# Patient Record
Sex: FEMALE | URBAN - NONMETROPOLITAN AREA
[De-identification: names, ages, dates, MRNs, and addresses within clinical notes are randomized per-mention and may not be internally consistent; named-entity substitution may affect disease eponyms.]

---

## 2022-06-10 ENCOUNTER — APPOINTMENT (RX ONLY)
Dept: URBAN - NONMETROPOLITAN AREA CLINIC 31 | Facility: CLINIC | Age: 47
Setting detail: DERMATOLOGY
End: 2022-06-10

## 2022-06-10 DIAGNOSIS — Z41.9 ENCOUNTER FOR PROCEDURE FOR PURPOSES OTHER THAN REMEDYING HEALTH STATE, UNSPECIFIED: ICD-10-CM

## 2022-06-10 PROCEDURE — ? DIAMONDGLOW

## 2022-06-10 PROCEDURE — ? PATIENT SPECIFIC COUNSELING

## 2022-06-10 PROCEDURE — ? DERMAPLANE

## 2022-06-10 ASSESSMENT — LOCATION DETAILED DESCRIPTION DERM
LOCATION DETAILED: LEFT INFERIOR MEDIAL FOREHEAD
LOCATION DETAILED: LEFT INFERIOR CENTRAL MALAR CHEEK

## 2022-06-10 ASSESSMENT — LOCATION SIMPLE DESCRIPTION DERM
LOCATION SIMPLE: LEFT CHEEK
LOCATION SIMPLE: LEFT FOREHEAD

## 2022-06-10 ASSESSMENT — LOCATION ZONE DERM: LOCATION ZONE: FACE

## 2022-06-10 NOTE — PROCEDURE: DIAMONDGLOW
Number Of Passes: 2
Intelliflow Setting: 100%
Vacuum Pressure In Inches: 3
Detail Level: Zone
Solution Used: SkinMedica HA5 Hydrator
Endpoint: mild erythema
Post-Care Instructions: I reviewed with the patient in detail post-care instructions. Patient should stay away from the sun and wear sun protection until treated areas are fully healed.
Consent: Written consent obtained, risks reviewed including but not limited to crusting, scabbing, blistering, scarring, darker or lighter pigmentary change, bruising, and/or incomplete response.
Body Treatment Head Used?: Not Used
Facial Treatment Head Used?: Facial: Fine (120 grit) 6 mm

## 2022-06-10 NOTE — PROCEDURE: PATIENT SPECIFIC COUNSELING
Detail Level: Zone
Does not have a skin regimen. Would like to avoid too many steps. Recommended Epionce intensive nourishing cream due to its versatile anti-aging ingredients and hydration recommended Epionce spf. \\nRecommended C&B at least three sessions in the fall. Recommended DiamondGlow every 4-8 weeks for a nice deep clean.\\n\\n\\nFriends and fam applied DH’s family member.

## 2022-06-10 NOTE — PROCEDURE: DERMAPLANE
Pre-Procedure Text: The patient was placed in a recumbant position on the procedure table.
Post-Procedure Instructions: Following the dermaplane procedure, Oxymist treatment was applied to the treatment areas. Moisturizer and SPF was applied.
Post-Care Instructions: I reviewed with the patient in detail post-care instructions.
Treatment Areas: face and neck
Detail Level: Zone
Blade: 10 blade scalpel

## 2022-06-16 ENCOUNTER — APPOINTMENT (RX ONLY)
Dept: URBAN - NONMETROPOLITAN AREA CLINIC 25 | Facility: CLINIC | Age: 47
Setting detail: DERMATOLOGY
End: 2022-06-16

## 2022-06-16 DIAGNOSIS — L98.8 OTHER SPECIFIED DISORDERS OF THE SKIN AND SUBCUTANEOUS TISSUE: ICD-10-CM

## 2022-06-16 PROCEDURE — ? BOTOX

## 2022-06-16 PROCEDURE — ? MEDICAL CONSULTATION: DYNAMIC RHYTIDES

## 2022-06-16 NOTE — PROCEDURE: BOTOX
Lot #: [id number]
Total Units: 44
Post-Care Instructions: Patient instructed to not lie down for 4 hours and limit physical activity for 24 hours.
Consent: Written consent obtained. Risks include but not limited to lid/brow ptosis, bruising, swelling, diplopia, temporary effect, incomplete chemical denervation.
Map Statement: Please see attached map for locations and injection amounts.
Detail Level: Detailed
Expiration Date (Month Year): 07/2024

## 2022-07-06 ENCOUNTER — APPOINTMENT (RX ONLY)
Dept: URBAN - NONMETROPOLITAN AREA CLINIC 31 | Facility: CLINIC | Age: 47
Setting detail: DERMATOLOGY
End: 2022-07-06

## 2022-07-06 DIAGNOSIS — L98.8 OTHER SPECIFIED DISORDERS OF THE SKIN AND SUBCUTANEOUS TISSUE: ICD-10-CM

## 2022-07-06 PROCEDURE — ? BOTOX

## 2022-07-06 NOTE — PROCEDURE: BOTOX
Consent: Written consent obtained. Risks include but not limited to lid/brow ptosis, bruising, swelling, diplopia, temporary effect, incomplete chemical denervation.
Post-Care Instructions: Patient instructed to not lie down for 4 hours and limit physical activity for 24 hours.
Total Units: 4
Map Statement: Please see attached map for locations and injection amounts.
Detail Level: Detailed

## 2023-06-07 ENCOUNTER — APPOINTMENT (RX ONLY)
Dept: URBAN - NONMETROPOLITAN AREA CLINIC 31 | Facility: CLINIC | Age: 48
Setting detail: DERMATOLOGY
End: 2023-06-07

## 2023-06-07 DIAGNOSIS — L98.8 OTHER SPECIFIED DISORDERS OF THE SKIN AND SUBCUTANEOUS TISSUE: ICD-10-CM

## 2023-06-07 PROCEDURE — ? INVENTORY

## 2023-06-07 PROCEDURE — ? BOTOX

## 2023-06-07 NOTE — PROCEDURE: BOTOX
Total Units: 0
Post-Care Instructions: Patient instructed to not lie down for 4 hours and limit physical activity for 24 hours.
Map Statement: Please see attached map for locations and injection amounts.
Consent: Written consent obtained. Risks include but not limited to lid/brow ptosis, bruising, swelling, diplopia, temporary effect, incomplete chemical denervation.
Show Inventory Tab: Hide
Detail Level: Detailed